# Patient Record
(demographics unavailable — no encounter records)

---

## 2024-11-22 NOTE — HISTORY OF PRESENT ILLNESS
[FreeTextEntry1] : PCP: Dr. Alonzo Vas  23M who presents for cardiac evaluation  two months ago with a week of palpitations episodic, lasted approx 30 min an episode has CP and SOB no LOC +presyncope does report more caffeine than usual during this time no other sympathomimetics  no intercurrent sx  former marijuana, none currently  eats healthy relatively sedentary  Born in [place], no recent visit back  Fam hx: Father - HTN

## 2024-11-22 NOTE — DISCUSSION/SUMMARY
[EKG obtained to assist in diagnosis and management of assessed problem(s)] : EKG obtained to assist in diagnosis and management of assessed problem(s) [FreeTextEntry1] : suspect possible tachyarrhythmia check TTE to evaluate for structural heart disease check TST to evaluate for exercise-induced arrhythmia symptom-triggered heart monitor should sx recur heart healthy lifestyle reviewed